# Patient Record
(demographics unavailable — no encounter records)

---

## 2024-10-15 NOTE — ASSESSMENT
[FreeTextEntry1] : 46 year old female being seen for evaluation of episodic dizziness with changes in position as well as hx of syncope in 2023. MRI Brain and CTA Head and Neck done at [facility] ED after syncope in 2023 revealed no significant pathology.  EEg which resulted normal. Patient is being worked up by cardiology.  She denies further syncope and her dizziness is less intense and less frequent. She will continue increasing daily hydration, mall frequent meals throughout the day and will RTO for f/u is her symptoms return.   Supervising Physician : Willem Xiong MD

## 2024-10-15 NOTE — HISTORY OF PRESENT ILLNESS
[FreeTextEntry1] : Original Presentation : It's a pleasure to see Ms. ELIZABETH MARTINEZ In the office today. She is a 46 year - old woman who presents to the office today for the evaluation of dizziness. She reports that she had 3 episodes of syncope last year and was in the hospital where MRI of the brain, CTA of head and neck were done and no significant vessel stenosis was seen. Recently she had additional episodes and when blood pressure was taken, she said her systolic blood pressure was only 60 or 70 with diastolic pressure of 50. In the past however, her blood pressure was not known to be low and was not thought to contribute to her 3 episodes of syncope last year. She has a follow-up with cardiologist soon   Diagnostic Testing :   Routine EEg 7.9.24 : Normal study   Today : Today I had the pleasure of seeing Ms. MARTINEZ in our office for follow up.  Their past medical history and imaging have been reviewed.   Patient first presented to our office for evaluation of intermittent episodes of dizziness as well as 3 episodes of syncope last year. Around the time of her syncopal episodes, she was taken to the ED where MRI of the Brain, and CTA of head and neck were completed and no significant abnormalities were noted. Routine EEg completed 7.9.24 resulted normal without evidence of epileptiform activity. Patient has since undergone cardiac evaluation including EKG, stress test and Echo. Today patient reports that her symptoms have resolved without recurrence and is otherwise feeling well. She denies any new or progressive neurologic symptoms at this time.

## 2024-10-15 NOTE — PHYSICAL EXAM
[Person] : oriented to person [Place] : oriented to place [Time] : oriented to time [Concentration Intact] : normal concentrating ability [Visual Intact] : visual attention was ~T not ~L decreased [Naming Objects] : no difficulty naming common objects [Repeating Phrases] : no difficulty repeating a phrase [Writing A Sentence] : no difficulty writing a sentence [Fluency] : fluency intact [Comprehension] : comprehension intact [Reading] : reading intact [Past History] : adequate knowledge of personal past history [Cranial Nerves Optic (II)] : visual acuity intact bilaterally,  visual fields full to confrontation, pupils equal round and reactive to light [Cranial Nerves Oculomotor (III)] : extraocular motion intact [Cranial Nerves Facial (VII)] : face symmetrical [Cranial Nerves Trigeminal (V)] : facial sensation intact symmetrically [Cranial Nerves Vestibulocochlear (VIII)] : hearing was intact bilaterally [Cranial Nerves Glossopharyngeal (IX)] : tongue and palate midline [Cranial Nerves Accessory (XI - Cranial And Spinal)] : head turning and shoulder shrug symmetric [Cranial Nerves Hypoglossal (XII)] : there was no tongue deviation with protrusion [Motor Strength] : muscle strength was normal in all four extremities [No Muscle Atrophy] : normal bulk in all four extremities [Sensation Tactile Decrease] : light touch was intact [Balance] : balance was intact [2+] : Ankle jerk left 2+ [PERRL With Normal Accommodation] : pupils were equal in size, round, reactive to light, with normal accommodation [Hearing Threshold Finger Rub Not [place]] : hearing was normal [Extraocular Movements] : extraocular movements were intact [Neck Appearance] : the appearance of the neck was normal [Abnormal Walk] : normal gait [Involuntary Movements] : no involuntary movements were seen [Motor Tone] : muscle strength and tone were normal [Motor Strength Upper Extremities Bilaterally] : strength was normal in both upper extremities [Motor Strength Lower Extremities Bilaterally] : strength was normal in both lower extremities [Past-pointing] : there was no past-pointing [Tremor] : no tremor present [Plantar Reflex Right Only] : normal on the right [Plantar Reflex Left Only] : normal on the left